# Patient Record
Sex: FEMALE
[De-identification: names, ages, dates, MRNs, and addresses within clinical notes are randomized per-mention and may not be internally consistent; named-entity substitution may affect disease eponyms.]

---

## 2017-03-01 ENCOUNTER — HOSPITAL ENCOUNTER (EMERGENCY)
Dept: HOSPITAL 25 - UCCORT | Age: 42
Discharge: HOME | End: 2017-03-01
Payer: COMMERCIAL

## 2017-03-01 VITALS — DIASTOLIC BLOOD PRESSURE: 73 MMHG | SYSTOLIC BLOOD PRESSURE: 142 MMHG

## 2017-03-01 DIAGNOSIS — J40: Primary | ICD-10-CM

## 2017-03-01 PROCEDURE — 99212 OFFICE O/P EST SF 10 MIN: CPT

## 2017-03-01 PROCEDURE — G0463 HOSPITAL OUTPT CLINIC VISIT: HCPCS

## 2017-03-01 NOTE — UC
Respiratory Complaint HPI





- HPI Summary


HPI Summary: 





sore thraot from coughin, some ear pain today, cough has been forceful for the 

last week, no fever.





- History of Current Complaint


Chief Complaint: UCGeneralIllness


Stated Complaint: COUGH,SORE THROAT,EAR PAIN


Time Seen by Provider: 03/01/17 18:30


Hx Obtained From: Patient


Hx Last Menstrual Period: 2 weeks ago


Pregnant?: No


Onset/Duration: Sudden Onset, Lasting Days


Timing: Constant


Severity Initially: Mild


Severity Currently: Moderate


Pain Intensity: 6


Pain Scale Used: 0-10 Numeric


Character: Cough: Nonproductive


Aggravating Factors: Exertion, Deep Breaths, Recumbent Position


Alleviating Factors: Nothing


Associated Signs And Symptoms: Positive: Wheezing, URI, Hoarseness





- Risk Factors


Pulmonary Embolism Risk Factors: Negative


Cardiac Risk Factors: Negative


Pseudomonas Risk Factors: Negative


Tuberculosis Risk Factors: Negative





- Allergies/Home Medications


Allergies/Adverse Reactions: 


 Allergies











Allergy/AdvReac Type Severity Reaction Status Date / Time


 


environmental Allergy  Eyes Uncoded 03/01/17 18:07





   Itchy/Swollen/Red/Watery  











Home Medications: 


 Home Medications





guaiFENesin ER TAB [Mucinex*]  03/01/17 [History]











PMH/Surg Hx/FS Hx/Imm Hx


Previously Healthy: Yes





- Surgical History


Surgical History: Yes


Surgery Procedure, Year, and Place: 11/2014 skin cyts





- Family History


Known Family History: Positive: Hypertension, Diabetes, Other - mother - breast 

CA





- Social History


Alcohol Use: None


Substance Use Type: None


Smoking Status (MU): Never Smoked Tobacco





Review of Systems


Constitutional: Negative


Skin: Negative


Eyes: Negative


ENT: Sore Throat, Ear Ache


Respiratory: Shortness Of Breath, Cough


Cardiovascular: Negative


Gastrointestinal: Negative


Genitourinary: Negative


Motor: Negative


Neurovascular: Negative


Musculoskeletal: Negative


Neurological: Negative


Psychological: Negative


All Other Systems Reviewed And Are Negative: Yes





Physical Exam


Triage Information Reviewed: Yes


Appearance: Well-Nourished, Ill-Appearing, Pain Distress


Vital Signs: 


 Initial Vital Signs











Temp  98.0 F   03/01/17 18:08


 


Pulse  79   03/01/17 18:08


 


Resp  16   03/01/17 18:08


 


BP  142/73   03/01/17 18:08


 


Pulse Ox  100   03/01/17 18:08











Vital Signs Reviewed: Yes


Eye Exam: Normal


Eyes: Positive: Conjunctiva Clear


Dental Exam: Normal


Neck exam: Normal


Neck: Positive: Supple, Nontender, No Lymphadenopathy


Respiratory: Positive: Chest non-tender, No respiratory distress, No accessory 

muscle use, Wheezing, Inspiration, Other: - dry cough present


Cardiovascular Exam: Normal


Cardiovascular: Positive: RRR, No Murmur, Pulses Normal


Abdominal Exam: Normal


Abdomen Description: Positive: Nontender, No Organomegaly, Soft


Bowel Sounds: Positive: Present


Musculoskeletal Exam: Normal


Musculoskeletal: Positive: Strength Intact, ROM Intact, No Edema


Neurological Exam: Normal


Neurological: Positive: Alert, Muscle Tone Normal


Psychological Exam: Normal


Skin Exam: Normal





UC Diagnostic Evaluation





- Laboratory


O2 Sat by Pulse Oximetry: 100





Respiratory Course/Dx





- Course


Course Of Treatment: hx obtained, exam performed, meds reviewed, treated for 

bronchitis





- Differential Dx/Diagnosis


Differential Diagnosis/HQI/PQRI: Asthma, Bronchitis, Influenza, Laryngitis, 

Sinusitis


Provider Diagnoses: bronchitis





Discharge





- Discharge Plan


Condition: Stable


Disposition: HOME


Prescriptions: 


predniSONE TAB* [Deltasone TAB*] 40 mg PO DAILY #10 tab


Patient Education Materials:  Acute Bronchitis (ED)


Referrals: 


Christopher Valadez MD [Primary Care Provider] - 


Additional Instructions: 


Take the medication as prescribed, start it tomorrow morning. 


Increase fluid intake, warm beverages help soothe the throat.

## 2017-10-31 ENCOUNTER — HOSPITAL ENCOUNTER (EMERGENCY)
Dept: HOSPITAL 25 - UCCORT | Age: 42
Discharge: HOME | End: 2017-10-31
Payer: COMMERCIAL

## 2017-10-31 VITALS — DIASTOLIC BLOOD PRESSURE: 66 MMHG | SYSTOLIC BLOOD PRESSURE: 136 MMHG

## 2017-10-31 DIAGNOSIS — J01.90: Primary | ICD-10-CM

## 2017-10-31 PROCEDURE — G0463 HOSPITAL OUTPT CLINIC VISIT: HCPCS

## 2017-10-31 PROCEDURE — 99212 OFFICE O/P EST SF 10 MIN: CPT

## 2017-10-31 NOTE — UC
Respiratory Complaint HPI





- HPI Summary


HPI Summary: 





41 yo female with cough and sinus pain/post nasal drip





bilat ear pressure











- History of Current Complaint


Chief Complaint: UCGeneralIllness


Stated Complaint: HEAD CONGESTION,RIGHT EAR PAIN,COUGH


Time Seen by Provider: 10/31/17 19:05


Hx Obtained From: Patient


Hx Last Menstrual Period: 10/21/17


Onset/Duration: Gradual Onset, Lasting Days


Timing: Constant


Severity Initially: Moderate


Severity Currently: Moderate


Pain Intensity: 4


Pain Scale Used: 0-10 Numeric


Character: Cough: Nonproductive


Aggravating Factors: Nothing


Associated Signs And Symptoms: Positive: Nasal Congestion, Hoarseness, Sinus 

Discomfort





- Allergies/Home Medications


Allergies/Adverse Reactions: 


 Allergies











Allergy/AdvReac Type Severity Reaction Status Date / Time


 


environmental Allergy  Eyes Uncoded 10/31/17 18:45





   Itchy/Swollen/Red/Watery  














PMH/Surg Hx/FS Hx/Imm Hx


Previously Healthy: Yes


Respiratory History: Bronchitis





- Surgical History


Surgical History: Yes


Surgery Procedure, Year, and Place: 11/2014 skin cyts





- Family History


Known Family History: Positive: Hypertension, Diabetes, Other - mother - breast 

CA





- Social History


Alcohol Use: None


Substance Use Type: None


Smoking Status (MU): Never Smoked Tobacco





- Immunization History


Most Recent Influenza Vaccination: none





Review of Systems


Constitutional: Negative, Fatigue


Skin: Negative


Eyes: Negative


ENT: Nasal Discharge, Sinus Congestion, Sinus Pain/Tenderness


Respiratory: Cough


Cardiovascular: Negative


Gastrointestinal: Negative


Genitourinary: Negative


Motor: Negative


Neurovascular: Negative


Musculoskeletal: Negative


Neurological: Negative


Psychological: Negative


Is Patient Immunocompromised?: No


All Other Systems Reviewed And Are Negative: Yes





Physical Exam


Triage Information Reviewed: Yes


Appearance: Well-Appearing, No Pain Distress, Well-Nourished


Vital Signs: 


 Initial Vital Signs











Temp  96.2 F   10/31/17 18:40


 


Pulse  96   10/31/17 18:40


 


Resp  17   10/31/17 18:40


 


BP  136/66   10/31/17 18:40


 


Pulse Ox  100   10/31/17 18:40











Vital Signs Reviewed: Yes


Eyes: Positive: Conjunctiva Clear


ENT: Positive: Hearing grossly normal, Nasal congestion, Nasal drainage, TM 

bulging, Hoarse voice, Sinus tenderness, Uvula midline.  Negative: Tonsillar 

swelling, Tonsillar exudate, Dental tenderness


Dental: Negative: Dental Fracture @


Neck: Positive: Supple, Nontender, No Lymphadenopathy


Respiratory: Positive: Lungs clear, Normal breath sounds, No respiratory 

distress


Cardiovascular: Positive: RRR, No Murmur


Musculoskeletal: Positive: ROM Intact, No Edema


Neurological: Positive: Alert


Psychological Exam: Normal


Skin Exam: Normal





UC Diagnostic Evaluation





- Laboratory


O2 Sat by Pulse Oximetry: 100 - normal/not hypoxic





Respiratory Course/Dx





- Differential Dx/Diagnosis


Provider Diagnoses: acute sinusitis





Discharge





- Discharge Plan


Condition: Stable


Disposition: HOME


Prescriptions: 


Amoxicillin PO (*) [Amoxicillin 875 MG (*)] 875 mg PO BID #20 tab


Benzonatate CAP* [Tessalon 100 MG CAP*] 100 - 200 mg PO TID PRN #[id number] cap


 PRN Reason: Cough


Patient Education Materials:  Acute Bronchitis (ED)


Referrals: 


Christopher Valadez MD [Primary Care Provider] - 


Additional Instructions: 


recheck for new or worsening symptoms

## 2017-11-02 ENCOUNTER — HOSPITAL ENCOUNTER (EMERGENCY)
Dept: HOSPITAL 25 - UCCORT | Age: 42
Discharge: HOME | End: 2017-11-02
Payer: COMMERCIAL

## 2017-11-02 VITALS — DIASTOLIC BLOOD PRESSURE: 77 MMHG | SYSTOLIC BLOOD PRESSURE: 152 MMHG

## 2017-11-02 DIAGNOSIS — R10.9: Primary | ICD-10-CM

## 2017-11-02 PROCEDURE — G0463 HOSPITAL OUTPT CLINIC VISIT: HCPCS

## 2017-11-02 PROCEDURE — 99212 OFFICE O/P EST SF 10 MIN: CPT

## 2019-07-05 NOTE — UC
Abdominal Pain Female HPI





- HPI Summary


HPI Summary: 





MID ABDOMINAL PAIN X 1 DAY 


NO RADIATION OF THE PAIN , WORSE WITH EATING 


+ NAUSEA , NO VOMITING, NO DIARRHEA OR CONSTIPATION  


NO FEVER, NO CHILLS 





- History of Current Complaint


Chief Complaint: UCGI


Stated Complaint: NAUSEA


Time Seen by Provider: 11/02/17 19:58


Hx Obtained From: Patient


Hx Last Menstrual Period: 10/21/17


Pregnant?: No


Onset/Duration: Gradual Onset, Lasting Days - 1, Still Present


Timing: Constant


Severity Initially: Moderate


Severity Currently: Moderate


Location: Discrete At: [facility]


Radiates: No


Character: Aching, Cramping


Aggravating Factor(s): Food


Alleviating Factor(s): Nothing


Associated Signs and Symptoms: Positive: Nausea.  Negative: Diaphoresis, Fever, 

Cough, Chest Pain, Dizzy, Back Pain, Constipation, Blood in Stool, Urinary 

Symptoms, Decreased Appetite, Vaginal Bleeding, Vaginal Discharge, Vomiting, 

Diarrhea


Allergies/Adverse Reactions: 


 Allergies











Allergy/AdvReac Type Severity Reaction Status Date / Time


 


environmental Allergy  Eyes Uncoded 11/02/17 19:55





   Itchy/Swollen/Red/Watery  














PMH/Surg Hx/FS Hx/Imm Hx


Previously Healthy: Yes





- Surgical History


Surgical History: Yes


Surgery Procedure, Year, and Place: 11/2014 skin cyts





- Family History


Known Family History: Positive: Hypertension, Diabetes, Other - mother - breast 

CA





- Social History


Alcohol Use: None


Substance Use Type: None


Smoking Status (MU): Never Smoked Tobacco





- Immunization History


Most Recent Influenza Vaccination: none





Review of Systems


Constitutional: Negative


Skin: Negative


Eyes: Negative


ENT: Negative


Respiratory: Negative


Gastrointestinal: Abdominal Pain, Nausea


Genitourinary: Negative


Is Patient Immunocompromised?: No


All Other Systems Reviewed And Are Negative: Yes





Physical Exam


Triage Information Reviewed: Yes


Appearance: Ill-Appearing, Pain Distress, Obese


Vital Signs: 


 Initial Vital Signs











Temp  97.7 F   11/02/17 19:45


 


Pulse  94   11/02/17 19:45


 


Resp  18   11/02/17 19:45


 


BP  152/77   11/02/17 19:45











Vital Signs Reviewed: Yes


Eyes: Positive: Conjunctiva Clear


ENT: Positive: Normal ENT inspection, Hearing grossly normal, Pharynx normal


Neck: Positive: Supple, Nontender, No Lymphadenopathy


Respiratory: Positive: Chest non-tender, Lungs clear, Normal breath sounds


Cardiovascular: Positive: RRR, No Murmur, Pulses Normal


Abdomen Description: Positive: Soft, Other: - + JJ SIGN.  Negative: CVA 

Tenderness (R), CVA Tenderness (L), Distended, Guarding


Bowel Sounds: Positive: Present


Musculoskeletal Exam: Normal


Neurological: Positive: Alert


Skin Exam: Normal





Abd Pain Female Course/Dx





- Differential Dx/Diagnosis


Provider Diagnoses: ABDOMINAL PAIN





Discharge





- Discharge Plan


Condition: Stable


Disposition: HOME


Patient Education Materials:  Abdominal Pain (ED)


Referrals: 


Christopher Valadez MD [Primary Care Provider] - 


Additional Instructions: 


ABDOMINAL PAIN / NAUSEA 


RUQ TENDERNESS , + JJ 


CONCERN ABOUT YOUR GALLBLADDER


PLEASE GO TO [facility] ED FOR EVAL AND TX no radiation

## 2019-11-19 ENCOUNTER — HOSPITAL ENCOUNTER (EMERGENCY)
Dept: HOSPITAL 25 - UCCORT | Age: 44
Discharge: HOME | End: 2019-11-19
Payer: COMMERCIAL

## 2019-11-19 VITALS — DIASTOLIC BLOOD PRESSURE: 81 MMHG | SYSTOLIC BLOOD PRESSURE: 158 MMHG

## 2019-11-19 DIAGNOSIS — Z91.09: ICD-10-CM

## 2019-11-19 DIAGNOSIS — L08.9: Primary | ICD-10-CM

## 2019-11-19 PROCEDURE — G0463 HOSPITAL OUTPT CLINIC VISIT: HCPCS

## 2019-11-19 PROCEDURE — 99212 OFFICE O/P EST SF 10 MIN: CPT

## 2019-11-19 NOTE — UC
Lower Extremity/Ankle HPI





- HPI Summary


HPI Summary: 





Pt c/o swelling, erythematous, and clear/brown colored discharge to left great 

toe. Pt states that she had an ingrown nail on this toe prior to getting a 

pedicure, one week ago and after pedicure, pain swelling and discharge has 

worsened.  Pt has been soaking toe in epsom salts, using betadine and 

antibiotic ointment to try to improve symptoms with no improvement.  





- History of Current Complaint


Chief Complaint: UCSkin


Stated Complaint: LEFT BIG TOE COMPLAINT


Time Seen by Provider: 11/19/19 17:38


Hx Obtained From: Patient


Hx Last Menstrual Period: 11/17/19


Pregnant?: No


Onset/Duration: Gradual Onset, Lasting Days, Still Present, Worse Since - onset


Severity Initially: Mild


Severity Currently: Moderate


Pain Intensity: 1


Aggravating Factor(s): Standing, Ambulation


Alleviating Factor(s): Rest


Able to Bear Weight: Yes





- Risk Factors


Gout Risk Factors: Age Over 40


DVT Risk Factors: Negative


Septic Arthritis Risk Factor: Negative





- Allergies/Home Medications


Allergies/Adverse Reactions: 


 Allergies











Allergy/AdvReac Type Severity Reaction Status Date / Time


 


mold Allergy  "Sneezes, Verified 11/19/19 17:18





   Wheezes,  





   and  





   Eyeballs  





   Swell Up"  


 


Animals that Shed Allergy  "Sneezes, Uncoded 11/19/19 17:18





   Wheezes,  





   and  





   Eyeballs  





   Swell Up"  


 


environmental Allergy  "Sneezes, Uncoded 11/19/19 17:18





   Wheezes,  





   and  





   Eyeballs  





   Swell Up"  











Home Medications: 


 Home Medications





Loratadine/Pseudoephedrine [Claritin-D 24 Hour Tablet] 1 tab PO DAILY 11/19/19 [

History Confirmed 11/19/19]











PMH/Surg Hx/FS Hx/Imm Hx


Previously Healthy: Yes





- Surgical History


Surgical History: Yes


Surgery Procedure, Year, and Place: 11/2014 skin cyts





- Family History


Known Family History: Positive: Hypertension, Diabetes, Other - mother - breast 

CA





- Social History


Occupation: Employed Full-time


Lives: With Family


Alcohol Use: None


Substance Use Type: None


Smoking Status (MU): Never Smoked Tobacco


Have You Smoked in the Last Year: No





- Immunization History


Most Recent Influenza Vaccination: none





Review of Systems


All Other Systems Reviewed And Are Negative: Yes


Skin: Positive: Other - erythema, swelling


Eyes: Positive: Negative


ENT: Positive: Negative


Respiratory: Positive: Negative


Cardiovascular: Positive: Negative


Gastrointestinal: Positive: Negative


Motor: Positive: Negative


Neurovascular: Positive: Negative


Musculoskeletal: Positive: Edema - swelling along toenail of left great toe.


Neurological: Positive: Negative


Psychological: Positive: Negative


Is Patient Immunocompromised?: No





Physical Exam


Triage Information Reviewed: Yes


Appearance: Well-Appearing


Vital Signs: 


 Initial Vital Signs











Temp  98.7 F   11/19/19 17:15


 


Pulse  104   11/19/19 17:15


 


Resp  18   11/19/19 17:15


 


BP  158/81   11/19/19 17:15


 


Pulse Ox  100   11/19/19 17:15











Vital Signs Reviewed: Yes


Eye Exam: Normal


ENT Exam: Normal


ENT: Positive: Hearing grossly normal


Dental Exam: Normal


Neck exam: Normal


Respiratory: Positive: No respiratory distress


Musculoskeletal: Positive: Edema @ - erythemaous swelling along medial and 

lateral and distal part of left great toe with brown clear/ discharge medial 

aspect of left great toe nail.





Lower Extremity Course/Dx





- Differential Dx/Diagnosis


Differential Diagnosis/HQI/PQRI: Cellulitis, Infection


Provider Diagnosis: 


 Toe infection








Discharge ED





- Sign-Out/Discharge


Documenting (check all that apply): Patient Departure


All imaging exams completed and their final reports reviewed: No Studies





- Discharge Plan


Condition: Stable


Disposition: HOME


Patient Education Materials:  Wound Infection (ED), Swollen Joint (ED), Ingrown 

Nail (ED)


Referrals: 


Walter Hdz DO [Primary Care Provider] - 





- Billing Disposition and Condition


Condition: STABLE


Disposition: Home